# Patient Record
Sex: FEMALE | Race: WHITE | Employment: UNEMPLOYED | URBAN - METROPOLITAN AREA
[De-identification: names, ages, dates, MRNs, and addresses within clinical notes are randomized per-mention and may not be internally consistent; named-entity substitution may affect disease eponyms.]

---

## 2018-09-08 LAB
ANTIBODY SCREEN, EXTERNAL: NORMAL
CHLAMYDIA, EXTERNAL: NORMAL
HBSAG, EXTERNAL: NORMAL
HEPATITIS C AB,   EXT: NORMAL
N. GONORRHEA, EXTERNAL: NORMAL
RUBELLA, EXTERNAL: NORMAL
TYPE, ABO & RH, EXTERNAL: NORMAL

## 2019-11-08 ENCOUNTER — HOSPITAL ENCOUNTER (INPATIENT)
Age: 29
LOS: 2 days | Discharge: HOME | End: 2019-11-10
Attending: OBSTETRICS & GYNECOLOGY | Admitting: OBSTETRICS & GYNECOLOGY
Payer: OTHER GOVERNMENT

## 2019-11-08 LAB
ABO + RH BLD: NORMAL
ALBUMIN SERPL-MCNC: 2.5 G/DL (ref 3.4–5)
ALBUMIN/GLOB SERPL: 0.8 {RATIO} (ref 0.8–1.7)
ALP SERPL-CCNC: 183 U/L (ref 45–117)
ALT SERPL-CCNC: 19 U/L (ref 13–56)
AMPHET UR QL SCN: NEGATIVE
ANION GAP SERPL CALC-SCNC: 9 MMOL/L (ref 3–18)
AST SERPL-CCNC: 16 U/L (ref 10–38)
BARBITURATES UR QL SCN: NEGATIVE
BASOPHILS # BLD: 0 K/UL (ref 0–0.1)
BASOPHILS NFR BLD: 0 % (ref 0–2)
BENZODIAZ UR QL: NEGATIVE
BILIRUB SERPL-MCNC: 0.2 MG/DL (ref 0.2–1)
BLOOD GROUP ANTIBODIES SERPL: NORMAL
BUN SERPL-MCNC: 14 MG/DL (ref 7–18)
BUN/CREAT SERPL: 18 (ref 12–20)
CALCIUM SERPL-MCNC: 9.1 MG/DL (ref 8.5–10.1)
CANNABINOIDS UR QL SCN: NEGATIVE
CHLORIDE SERPL-SCNC: 106 MMOL/L (ref 100–111)
CO2 SERPL-SCNC: 24 MMOL/L (ref 21–32)
COCAINE UR QL SCN: NEGATIVE
CREAT SERPL-MCNC: 0.78 MG/DL (ref 0.6–1.3)
DIFFERENTIAL METHOD BLD: ABNORMAL
EOSINOPHIL # BLD: 0.1 K/UL (ref 0–0.4)
EOSINOPHIL NFR BLD: 1 % (ref 0–5)
ERYTHROCYTE [DISTWIDTH] IN BLOOD BY AUTOMATED COUNT: 15.5 % (ref 11.6–14.5)
GLOBULIN SER CALC-MCNC: 3.2 G/DL (ref 2–4)
GLUCOSE SERPL-MCNC: 112 MG/DL (ref 74–99)
HCT VFR BLD AUTO: 37.4 % (ref 35–45)
HDSCOM,HDSCOM: NORMAL
HGB BLD-MCNC: 12.2 G/DL (ref 12–16)
LYMPHOCYTES # BLD: 1.6 K/UL (ref 0.9–3.6)
LYMPHOCYTES NFR BLD: 18 % (ref 21–52)
MCH RBC QN AUTO: 32.2 PG (ref 24–34)
MCHC RBC AUTO-ENTMCNC: 32.6 G/DL (ref 31–37)
MCV RBC AUTO: 98.7 FL (ref 74–97)
METHADONE UR QL: NEGATIVE
MONOCYTES # BLD: 1 K/UL (ref 0.05–1.2)
MONOCYTES NFR BLD: 11 % (ref 3–10)
NEUTS SEG # BLD: 6.3 K/UL (ref 1.8–8)
NEUTS SEG NFR BLD: 70 % (ref 40–73)
OPIATES UR QL: NEGATIVE
PCP UR QL: NEGATIVE
PLATELET # BLD AUTO: 172 K/UL (ref 135–420)
PMV BLD AUTO: 9.9 FL (ref 9.2–11.8)
POTASSIUM SERPL-SCNC: 4.1 MMOL/L (ref 3.5–5.5)
PROT SERPL-MCNC: 5.7 G/DL (ref 6.4–8.2)
RBC # BLD AUTO: 3.79 M/UL (ref 4.2–5.3)
SODIUM SERPL-SCNC: 139 MMOL/L (ref 136–145)
SPECIMEN EXP DATE BLD: NORMAL
WBC # BLD AUTO: 9 K/UL (ref 4.6–13.2)

## 2019-11-08 PROCEDURE — 80307 DRUG TEST PRSMV CHEM ANLYZR: CPT

## 2019-11-08 PROCEDURE — 65270000029 HC RM PRIVATE

## 2019-11-08 PROCEDURE — 86900 BLOOD TYPING SEROLOGIC ABO: CPT

## 2019-11-08 PROCEDURE — 85025 COMPLETE CBC W/AUTO DIFF WBC: CPT

## 2019-11-08 PROCEDURE — 80053 COMPREHEN METABOLIC PANEL: CPT

## 2019-11-09 ENCOUNTER — ANESTHESIA (OUTPATIENT)
Dept: LABOR AND DELIVERY | Age: 29
End: 2019-11-09
Payer: OTHER GOVERNMENT

## 2019-11-09 ENCOUNTER — ANESTHESIA EVENT (OUTPATIENT)
Dept: LABOR AND DELIVERY | Age: 29
End: 2019-11-09
Payer: OTHER GOVERNMENT

## 2019-11-09 PROBLEM — O26.90 ABNORMAL PREGNANCY: Status: ACTIVE | Noted: 2019-11-09

## 2019-11-09 LAB
HCT VFR BLD AUTO: 34.5 % (ref 35–45)
HGB BLD-MCNC: 11.2 G/DL (ref 12–16)

## 2019-11-09 PROCEDURE — 74011000250 HC RX REV CODE- 250: Performed by: NURSE ANESTHETIST, CERTIFIED REGISTERED

## 2019-11-09 PROCEDURE — 74011000250 HC RX REV CODE- 250

## 2019-11-09 PROCEDURE — 74011250636 HC RX REV CODE- 250/636: Performed by: NURSE ANESTHETIST, CERTIFIED REGISTERED

## 2019-11-09 PROCEDURE — 75410000003 HC RECOV DEL/VAG/CSECN EA 0.5 HR

## 2019-11-09 PROCEDURE — 77030040830 HC CATH URETH FOL MDII -A

## 2019-11-09 PROCEDURE — 76060000078 HC EPIDURAL ANESTHESIA

## 2019-11-09 PROCEDURE — 77030007879 HC KT SPN EPDRL TELE -B: Performed by: NURSE ANESTHETIST, CERTIFIED REGISTERED

## 2019-11-09 PROCEDURE — 85014 HEMATOCRIT: CPT

## 2019-11-09 PROCEDURE — 3E0R3BZ INTRODUCTION OF ANESTHETIC AGENT INTO SPINAL CANAL, PERCUTANEOUS APPROACH: ICD-10-PCS | Performed by: OBSTETRICS & GYNECOLOGY

## 2019-11-09 PROCEDURE — 74011250636 HC RX REV CODE- 250/636: Performed by: OBSTETRICS & GYNECOLOGY

## 2019-11-09 PROCEDURE — 75410000002 HC LABOR FEE PER 1 HR

## 2019-11-09 PROCEDURE — 74011250637 HC RX REV CODE- 250/637: Performed by: OBSTETRICS & GYNECOLOGY

## 2019-11-09 PROCEDURE — 00HU33Z INSERTION OF INFUSION DEVICE INTO SPINAL CANAL, PERCUTANEOUS APPROACH: ICD-10-PCS | Performed by: OBSTETRICS & GYNECOLOGY

## 2019-11-09 PROCEDURE — 85018 HEMOGLOBIN: CPT

## 2019-11-09 PROCEDURE — 75410000000 HC DELIVERY VAGINAL/SINGLE

## 2019-11-09 PROCEDURE — 65270000029 HC RM PRIVATE

## 2019-11-09 PROCEDURE — 36415 COLL VENOUS BLD VENIPUNCTURE: CPT

## 2019-11-09 RX ORDER — SODIUM CHLORIDE, SODIUM LACTATE, POTASSIUM CHLORIDE, CALCIUM CHLORIDE 600; 310; 30; 20 MG/100ML; MG/100ML; MG/100ML; MG/100ML
125 INJECTION, SOLUTION INTRAVENOUS CONTINUOUS
Status: DISCONTINUED | OUTPATIENT
Start: 2019-11-09 | End: 2019-11-10 | Stop reason: HOSPADM

## 2019-11-09 RX ORDER — OXYTOCIN/RINGER'S LACTATE 20/1000 ML
125 PLASTIC BAG, INJECTION (ML) INTRAVENOUS CONTINUOUS
Status: DISCONTINUED | OUTPATIENT
Start: 2019-11-09 | End: 2019-11-09 | Stop reason: HOSPADM

## 2019-11-09 RX ORDER — MINERAL OIL
30 OIL (ML) ORAL AS NEEDED
Status: DISCONTINUED | OUTPATIENT
Start: 2019-11-09 | End: 2019-11-09 | Stop reason: HOSPADM

## 2019-11-09 RX ORDER — HYDROMORPHONE HYDROCHLORIDE 1 MG/ML
1 INJECTION, SOLUTION INTRAMUSCULAR; INTRAVENOUS; SUBCUTANEOUS
Status: DISCONTINUED | OUTPATIENT
Start: 2019-11-09 | End: 2019-11-09 | Stop reason: HOSPADM

## 2019-11-09 RX ORDER — SODIUM CHLORIDE, SODIUM LACTATE, POTASSIUM CHLORIDE, CALCIUM CHLORIDE 600; 310; 30; 20 MG/100ML; MG/100ML; MG/100ML; MG/100ML
125 INJECTION, SOLUTION INTRAVENOUS CONTINUOUS
Status: DISCONTINUED | OUTPATIENT
Start: 2019-11-09 | End: 2019-11-09 | Stop reason: HOSPADM

## 2019-11-09 RX ORDER — FENTANYL CITRATE 50 UG/ML
100 INJECTION, SOLUTION INTRAMUSCULAR; INTRAVENOUS ONCE
Status: COMPLETED | OUTPATIENT
Start: 2019-11-09 | End: 2019-11-09

## 2019-11-09 RX ORDER — NALBUPHINE HYDROCHLORIDE 10 MG/ML
10 INJECTION, SOLUTION INTRAMUSCULAR; INTRAVENOUS; SUBCUTANEOUS
Status: DISCONTINUED | OUTPATIENT
Start: 2019-11-09 | End: 2019-11-09 | Stop reason: HOSPADM

## 2019-11-09 RX ORDER — FENTANYL CITRATE 50 UG/ML
INJECTION, SOLUTION INTRAMUSCULAR; INTRAVENOUS
Status: DISPENSED
Start: 2019-11-09 | End: 2019-11-09

## 2019-11-09 RX ORDER — MISOPROSTOL 200 UG/1
TABLET ORAL
Status: DISPENSED
Start: 2019-11-09 | End: 2019-11-09

## 2019-11-09 RX ORDER — TERBUTALINE SULFATE 1 MG/ML
0.25 INJECTION SUBCUTANEOUS
Status: DISCONTINUED | OUTPATIENT
Start: 2019-11-09 | End: 2019-11-09 | Stop reason: HOSPADM

## 2019-11-09 RX ORDER — ROPIVACAINE HYDROCHLORIDE 2 MG/ML
INJECTION, SOLUTION EPIDURAL; INFILTRATION; PERINEURAL AS NEEDED
Status: DISCONTINUED | OUTPATIENT
Start: 2019-11-09 | End: 2019-11-09 | Stop reason: HOSPADM

## 2019-11-09 RX ORDER — AMOXICILLIN 250 MG
1 CAPSULE ORAL
Status: DISCONTINUED | OUTPATIENT
Start: 2019-11-09 | End: 2019-11-10 | Stop reason: HOSPADM

## 2019-11-09 RX ORDER — PROMETHAZINE HYDROCHLORIDE 25 MG/ML
25 INJECTION, SOLUTION INTRAMUSCULAR; INTRAVENOUS
Status: DISCONTINUED | OUTPATIENT
Start: 2019-11-09 | End: 2019-11-10 | Stop reason: HOSPADM

## 2019-11-09 RX ORDER — EPHEDRINE SULFATE/0.9% NACL/PF 50 MG/5 ML
10 SYRINGE (ML) INTRAVENOUS AS NEEDED
Status: DISCONTINUED | OUTPATIENT
Start: 2019-11-09 | End: 2019-11-09 | Stop reason: HOSPADM

## 2019-11-09 RX ORDER — METHYLERGONOVINE MALEATE 0.2 MG/ML
0.2 INJECTION INTRAVENOUS AS NEEDED
Status: DISCONTINUED | OUTPATIENT
Start: 2019-11-09 | End: 2019-11-09 | Stop reason: HOSPADM

## 2019-11-09 RX ORDER — ACETAMINOPHEN 325 MG/1
650 TABLET ORAL
Status: DISCONTINUED | OUTPATIENT
Start: 2019-11-09 | End: 2019-11-10 | Stop reason: HOSPADM

## 2019-11-09 RX ORDER — IBUPROFEN 400 MG/1
800 TABLET ORAL
Status: DISCONTINUED | OUTPATIENT
Start: 2019-11-09 | End: 2019-11-10 | Stop reason: HOSPADM

## 2019-11-09 RX ORDER — PHENYLEPHRINE HCL IN 0.9% NACL 1 MG/10 ML
80 SYRINGE (ML) INTRAVENOUS AS NEEDED
Status: DISCONTINUED | OUTPATIENT
Start: 2019-11-09 | End: 2019-11-09 | Stop reason: HOSPADM

## 2019-11-09 RX ORDER — LIDOCAINE HYDROCHLORIDE AND EPINEPHRINE 15; 5 MG/ML; UG/ML
INJECTION, SOLUTION EPIDURAL
Status: COMPLETED | OUTPATIENT
Start: 2019-11-09 | End: 2019-11-09

## 2019-11-09 RX ORDER — LIDOCAINE HYDROCHLORIDE 10 MG/ML
20 INJECTION, SOLUTION EPIDURAL; INFILTRATION; INTRACAUDAL; PERINEURAL AS NEEDED
Status: DISCONTINUED | OUTPATIENT
Start: 2019-11-09 | End: 2019-11-09 | Stop reason: HOSPADM

## 2019-11-09 RX ORDER — ZOLPIDEM TARTRATE 5 MG/1
5 TABLET ORAL
Status: DISCONTINUED | OUTPATIENT
Start: 2019-11-09 | End: 2019-11-10 | Stop reason: HOSPADM

## 2019-11-09 RX ORDER — OXYCODONE AND ACETAMINOPHEN 5; 325 MG/1; MG/1
2 TABLET ORAL
Status: DISCONTINUED | OUTPATIENT
Start: 2019-11-09 | End: 2019-11-10 | Stop reason: HOSPADM

## 2019-11-09 RX ORDER — OXYTOCIN/RINGER'S LACTATE 20/1000 ML
999 PLASTIC BAG, INJECTION (ML) INTRAVENOUS ONCE
Status: COMPLETED | OUTPATIENT
Start: 2019-11-09 | End: 2019-11-09

## 2019-11-09 RX ORDER — BUTORPHANOL TARTRATE 1 MG/ML
2 INJECTION INTRAMUSCULAR; INTRAVENOUS
Status: DISCONTINUED | OUTPATIENT
Start: 2019-11-09 | End: 2019-11-09 | Stop reason: HOSPADM

## 2019-11-09 RX ADMIN — ACETAMINOPHEN 650 MG: 325 TABLET, FILM COATED ORAL at 13:10

## 2019-11-09 RX ADMIN — FENTANYL CITRATE 100 MCG: 50 INJECTION, SOLUTION INTRAMUSCULAR; INTRAVENOUS at 01:31

## 2019-11-09 RX ADMIN — LIDOCAINE HYDROCHLORIDE,EPINEPHRINE BITARTRATE 3 ML: 15; .005 INJECTION, SOLUTION EPIDURAL; INFILTRATION; INTRACAUDAL; PERINEURAL at 01:42

## 2019-11-09 RX ADMIN — SODIUM CHLORIDE, SODIUM LACTATE, POTASSIUM CHLORIDE, AND CALCIUM CHLORIDE 1000 ML: 600; 310; 30; 20 INJECTION, SOLUTION INTRAVENOUS at 01:57

## 2019-11-09 RX ADMIN — Medication 10 MG: at 02:05

## 2019-11-09 RX ADMIN — SODIUM CHLORIDE, SODIUM LACTATE, POTASSIUM CHLORIDE, AND CALCIUM CHLORIDE 125 ML/HR: 600; 310; 30; 20 INJECTION, SOLUTION INTRAVENOUS at 01:31

## 2019-11-09 RX ADMIN — Medication 10 ML/HR: at 01:49

## 2019-11-09 RX ADMIN — IBUPROFEN 800 MG: 400 TABLET ORAL at 06:15

## 2019-11-09 RX ADMIN — ROPIVACAINE HYDROCHLORIDE 8 ML: 2 INJECTION, SOLUTION EPIDURAL; INFILTRATION at 01:48

## 2019-11-09 RX ADMIN — Medication 10 ML: at 01:45

## 2019-11-09 RX ADMIN — Medication 19980 MILLI-UNITS/HR: at 06:16

## 2019-11-09 RX ADMIN — IBUPROFEN 800 MG: 400 TABLET ORAL at 16:24

## 2019-11-09 NOTE — ROUTINE PROCESS
TRANSFER - IN REPORT: 
 
0725--Verbal report received from [facility], RN (name) on Shelbi Ledezma  being received from Labor and Delivery (unit) for routine progression of care Report consisted of patients Situation, Background, Assessment and  
Recommendations(SBAR). Information from the following report(s) SBAR, Kardex, Intake/Output, MAR and Recent Results was reviewed with the receiving nurse. Opportunity for questions and clarification was provided. 0850--Assessment completed. Vitals stable. Educated patient on normal bleeding and when to call nurse for assistance. Fundus firm, lochia small. 1300--Assessment completed. Vitals stable. 1625--Assessment completed. Vitals stable. BP elevated, will recheck in 1 hour. 1730--BP elevated. Patient denies vision changes and epigastric pain. Patient states she does have a headache that she's had on and off today. Patient states \"headaches are normal for me\" 1737--Dr. Andrei Fitzgerald paged. 1740--Spoke with Dr. Andrei Fitzgerald. Orders received to recheck BP at 2000 and call her if it's elevated. 1813--Mother doing well. Up without complaints. Voiding without problems. Pain managed well with motrin and tylenol. Bonding well with baby.

## 2019-11-09 NOTE — ANESTHESIA PREPROCEDURE EVALUATION
Relevant Problems   No relevant active problems       Anesthetic History   No history of anesthetic complications            Review of Systems / Medical History      Pulmonary  Within defined limits                 Neuro/Psych   Within defined limits           Cardiovascular    Hypertension: well controlled                   GI/Hepatic/Renal  Within defined limits              Endo/Other        Obesity     Other Findings              Physical Exam    Airway  Mallampati: II  TM Distance: 4 - 6 cm  Neck ROM: normal range of motion   Mouth opening: Normal     Cardiovascular    Rhythm: regular  Rate: normal         Dental  No notable dental hx       Pulmonary  Breath sounds clear to auscultation               Abdominal  GI exam deferred       Other Findings            Anesthetic Plan    ASA: 2  Anesthesia type: epidural            Anesthetic plan and risks discussed with: Patient and Spouse

## 2019-11-09 NOTE — ANESTHESIA POSTPROCEDURE EVALUATION
Post-Anesthesia Evaluation & Assessment    Visit Vitals  /79   Pulse (!) 101   Temp 37.1 °C (98.7 °F)   Resp 18   Ht 5' 8\" (1.727 m)   Wt 113.4 kg (250 lb)   SpO2 99%   Breastfeeding? Yes   BMI 38.01 kg/m²       Nausea/Vomiting: no nausea    Pain score (VAS): 0    Post-operative hydration adequate. Mental status & Level of consciousness: orientation per pre-anesthetic level    Neurological status: moves all extremities, sensation grossly intact    Pulmonary status: airway patent, no supplemental oxygen required    Complications related to anesthesia: none    Additional comments: Patient standing in room, caring for infant, denies headaches or complications associated with epidural.         Carole Azar CRNA  November 9, 2019  * No procedures listed *.    epidural    <BSHSIANPOST>    No vitals data found for the desired time range.

## 2019-11-09 NOTE — H&P
History & Physical    Name: Najma Duffy MRN: [id number]  SSN: [id number]    YOB: 1990  Age: 33 y.o. Sex: female        Subjective:     Estimated Date of Delivery: 19  OB History        5    Para   4    Term                AB        Living   4       SAB        TAB        Ectopic        Molar        Multiple        Live Births                    Ms. Liliam Gomez is admitted with pregnancy at 39w5d for SROM @ 2045 of bloody fluid. . Prenatal course was complicated by a single prenatal visit @ 21 weeks. .Prenatal care has been followed by [facility]. She has recently moved here from [place] and spouse is in the [employer]. She presented by ambulance for SROM. She was 1 cm on presentation and had progressed to 5 cm/90/0. Please see prenatal records for details. Past Medical History:   Diagnosis Date    Anemia     Essential hypertension      Past Surgical History:   Procedure Laterality Date    HX HERNIA REPAIR      HX OTHER SURGICAL       Social History     Occupational History    Not on file   Tobacco Use    Smoking status: Never Smoker    Smokeless tobacco: Never Used   Substance and Sexual Activity    Alcohol use: Not Currently    Drug use: Never    Sexual activity: Yes     Family History   Problem Relation Age of Onset    No Known Problems Mother     Hypertension Father     Stroke Father     Heart Disease Father     Asthma Father     Diabetes Father        No Known Allergies  Prior to Admission medications    Medication Sig Start Date End Date Taking? Authorizing Provider   PNV66-Iron Fumarate-FA-DSS-DHA 26-1.2- mg cap Take  by mouth. Yes Provider, Historical        Review of Systems: A comprehensive review of systems was negative except for that written in the HPI.     Objective:     Vitals:  Vitals:    19 2300 19 2315 19 2343 19 2345   BP: 124/59 125/74 (!) 143/123 (!) 144/113   Pulse: 88 92 (!) 104 (!) 118   Temp:       Weight: Height:            Physical Exam:  Heart: Regular rate and rhythm  Lung: clear to auscultation throughout lung fields, no wheezes, no rales, no rhonchi and normal respiratory effort  Fundus: soft and non tender  Perineum: blood present, amniotic fluid present  Cervical Exam: 5 cm dilated    90% effaced    0 station    Lower Extremities:  - Edema 1+  Membranes:  Spontaneous Rupture of Membranes; Amniotic Fluid: clear fluid  Fetal Heart Rate: minimal variability, difficult to trace    Prenatal Labs:   Lab Results   Component Value Date/Time    Rubella, External pos 2018    HBsAg, External neg 2018    Gonorrhea, External neg 2018    Chlamydia, External neg 2018     GBS- not done. Records confirm GBS + 2018    Assessment/Plan:     Plan: Admit for Labor  Progressing normally. Group B Strep was not tested. Insufficient prenatal care- UDS negative. Rubella- equivocal or low normal- will address postpartum  Desires epidural.  Anticipate . Case management consult placed.   Signed By:  Stephan Holbrook MD     2019

## 2019-11-09 NOTE — ANESTHESIA PROCEDURE NOTES
Epidural Block    Start time: 11/9/2019 1:25 AM  End time: 11/9/2019 1:50 AM  Performed by: Hermelinda Frank CRNA  Authorized by: Hermelinda Frank CRNA     Pre-Procedure  Indication: labor epidural    Preanesthetic Checklist: patient identified, risks and benefits discussed, anesthesia consent, site marked, patient being monitored, timeout performed and anesthesia consent    Timeout Time: 01:27        Epidural:   Patient position:  Seated  Prep region:  Lumbar  Prep: Betadine and Patient draped    Location:  L3-4    Needle and Epidural Catheter:   Needle Type:  Tuohy  Needle Gauge:  18 G  Injection Technique:  Loss of resistance using air  Attempts:  1  Catheter Size:  20 G  Events: no blood with aspiration, no cerebrospinal fluid with aspiration, no paresthesia and negative aspiration test    Test Dose:  Negative    Assessment:   Catheter Secured:  Tegaderm and tape  Insertion:  Uncomplicated  Patient tolerance:  Patient tolerated the procedure well with no immediate complications  Risks and benefits discussed with patient and spouse,patient wishes to proceed.

## 2019-11-09 NOTE — PROGRESS NOTES
Pt present to labor and delivery as a  at 39w5d with 1 prenatal visit. Pt reports she ruptured at  with bloody fluid, SVE 1 cm. Pt reports she is due 19. Pt has a very flat affect. Explained to patient I would be getting urine drug screen. Pt verbalized understanding. Dr. Hue Laguerre paged. Consents signed. IV started. Labs drawn and sent  0100 Dr. Hue Laguerre At bedside. 0115 Pt requesting Epidural. Anesthesia Paged. Pt reports pain level is 10/10  0125 Anesthesia at bedside. 0127 Time out at bedside  149 Epidural procedure complete. Pt reports pain level is 0/10  0236 Pt is comfortable and resting.  0408 Update Dr. Hue Laguerre on patient . We will begin pushing  0420 Called for Dr. Hue Laguerre to come for delivery  0425 Dr. Hue Laguerre at bedside  0430 Delivery of viable female infant  5056 Delivery of placenta  [id number] L&D recovery started  0450 Epidural catheter pulled blue tip intact. 0500 Pt tolerating regular diet  0540 Pt ambulated to bathroom. Voided 400 cc demonstrated pericare with bottle and dermaplast return demonstration appropriate. 0615 Pt ambulated to MBU to room 3412  0700 SBAR report given to oncoming shift.

## 2019-11-10 VITALS
SYSTOLIC BLOOD PRESSURE: 134 MMHG | TEMPERATURE: 98.8 F | OXYGEN SATURATION: 97 % | BODY MASS INDEX: 37.89 KG/M2 | DIASTOLIC BLOOD PRESSURE: 87 MMHG | RESPIRATION RATE: 18 BRPM | HEART RATE: 97 BPM | WEIGHT: 250 LBS | HEIGHT: 68 IN

## 2019-11-10 LAB — HCT VFR BLD AUTO: 37.1 % (ref 35–45)

## 2019-11-10 PROCEDURE — 74011250637 HC RX REV CODE- 250/637: Performed by: OBSTETRICS & GYNECOLOGY

## 2019-11-10 PROCEDURE — 36415 COLL VENOUS BLD VENIPUNCTURE: CPT

## 2019-11-10 PROCEDURE — 85014 HEMATOCRIT: CPT

## 2019-11-10 RX ORDER — IBUPROFEN 800 MG/1
800 TABLET ORAL
Qty: 30 TAB | Refills: 0 | Status: SHIPPED | OUTPATIENT
Start: 2019-11-10

## 2019-11-10 RX ADMIN — IBUPROFEN 800 MG: 400 TABLET ORAL at 13:09

## 2019-11-10 RX ADMIN — IBUPROFEN 800 MG: 400 TABLET ORAL at 03:55

## 2019-11-10 NOTE — DISCHARGE SUMMARY
Obstetrical Discharge Summary       [facility]  [address] [address]              Patient ID:  Padmini Frost  [id number]  64 y.o.  1990    Admit Date: 2019    Discharge Date: 11/10/2019     Admitting Physician: Andrade Mccarthy MD     Admission Diagnoses: Abnormal pregnancy [O26.90]    Discharge Diagnoses: same as above      Additional Diagnoses: none        Hospital Course: Unremarkable. She presented to L&D and delivered a VFI over intact perineum. She had minimal prenatal care with a single visit. She has had a normal postpartum period and is bottle feeding. She denies emotional concerns. Information for the patient's :  Ryan Marin [[id number]]          Immunizations: There is no immunization history for the selected administration types on file for this patient. Group Beta Strep: No results found for: GRBSEXT     Visit Vitals  /82 (BP 1 Location: Right arm, BP Patient Position: At rest;Sitting)   Pulse 100   Temp 98 °F (36.7 °C)   Resp 18   Ht 5' 8\" (1.727 m)   Wt 250 lb (113.4 kg)   LMP 2019   SpO2 98%   Breastfeeding? Yes   BMI 38.01 kg/m²       Vital signs stable, afebrile. Exam:  Patient without distress. Abdomen soft, fundus firm at level of umbilicus, non tender               Perineum with normal lochia noted. Lower extremities are negative for swelling, cords or tenderness. Patient Instructions:   Current Discharge Medication List      START taking these medications    Details   ibuprofen (MOTRIN) 800 mg tablet Take 1 Tab by mouth every eight (8) hours as needed for Pain. Indications: pain  Qty: 30 Tab, Refills: 0         CONTINUE these medications which have NOT CHANGED    Details   PNV66-Iron Fumarate-FA-DSS-DHA 26-1.2- mg cap Take  by mouth. See discharge instructions provided by nursing.     Follow-up in 6 weeks.    Signed:  Cristina House MD  11/10/2019  5:26 PM

## 2019-11-10 NOTE — PROGRESS NOTES
Progress Note    Patient: Gary Richardson MRN: [id number]  SSN: [id number]    YOB: 1990  Age: 33 y.o. Sex: female      Subjective:     Postpartum Day: 1     Delivery: vaginal delivery    Pt denies complaints. She has normal pain and bleeding. She is bottle feeding. Denies any emotional concerns. Objective:      Patient Vitals for the past 8 hrs:   BP Temp Pulse Resp SpO2   11/10/19 0830 129/82 98 °F (36.7 °C) 100 18 98 %     Insert Hgb Here    CV:        CHEST:        Uterine Fundus:   firm       Incision:  no significant drainage, no dehiscence, no significant erythema         Lab/Data Review:  CBC:   Lab Results   Component Value Date/Time    HCT 37.1 11/10/2019 05:00 AM       Assessment:     Status post: Doing well postpartum vaginal delivery     Plan:   Insufficient prenatal care- Case management consult pending. Postpartum care discussed including diet, ambulation, and actvitiy restrictions. Discharge instructions and questions answered for vaginal delivery.     Signed By: Marcio Chow MD     November 10, 2019

## 2019-11-10 NOTE — PROGRESS NOTES
2020- Spoke to Dr. Titi Waldron about pt's blood pressure of 142/88. No orders received at this time.  She said no interventions needed unless blood pressure goes above 160/110

## 2019-11-10 NOTE — PROGRESS NOTES
0725- Bedside and Verbal shift change report given to Kaylie candelario (oncoming nurse) by TAINA Elias rn (offgoing nurse). Report included the following information SBAR, Kardex, Procedure Summary, Intake/Output, MAR and Recent Results. Patient aware of hourly rounding and not waking patient if sleeping. 0830- Assessment completed at this time. Call light in reach. Bed low position, bed brakes on, bed rails x2. No needs. Updated on plan of care. [facility]  at bedside. 1630- Reassessment completed at this time. Call light in reach. Bed low position, bed brakes on, bed rails x2. No needs. Updated on plan of care. 1730- Aware of upcoming discharge. 0- Discharge instructions reviewed with patient. Understanding verbalized of all instructions given. Time given for questions, all questions answered. Electronic signature obtained.

## 2019-11-10 NOTE — ROUTINE PROCESS
Verbal shift change report given to Reji Ríos RN by Ileana Valentine RN. Report included the following information SBAR, Kardex, Procedure Summary, Intake/Output, MAR, Accordion, Recent Results and Med Rec Status.

## 2019-11-10 NOTE — PROGRESS NOTES
Paged by [facility], informed by nurse that case management consult in for lack of pre-ghulam care. Meet with pt & spouse, verified all demographics. Asked her about support @ home,  lives with her  & has \"other family\" here as well. [facility] she was seen @ [facility] for pre- care in , July & August & then she went to [place] for awhile to care for her father, who is dying. [facility] took pre-ghulam vitamins. [facility] has all the baby equipment. [facility] will be going to pediatrician @ [facility].  [facility] feels ready to go home. Spoke with pt's nurse & informed her of above. Lakeshia CoronaRN,ext [phone].

## 2019-11-10 NOTE — L&D DELIVERY NOTE
Delivery Summary    Patient: Ras Higgins MRN: [id number]  SSN: [id number]    YOB: 1990  Age: 33 y.o. Sex: female       Information for the patient's :  Amie Shoulder [[id number]]       Labor Events:    Labor: No    Steroids: None   Cervical Ripening Date/Time:       Cervical Ripening Type: None   Antibiotics During Labor: No   Rupture Identifier:      Rupture Date/Time: 2019 8:45 PM   Rupture Type: SROM   Amniotic Fluid Volume: Moderate    Amniotic Fluid Description: Blood stained    Amniotic Fluid Odor: None    Induction: None       Induction Date/Time:        Indications for Induction:      Augmentation: None   Augmentation Date/Time:      Indications for Augmentation:     Labor complications: None       Additional complications:        Delivery Events:  Indications For Episiotomy:     Episiotomy:     Perineal Laceration(s):     Repaired:     Periurethral Laceration Location:      Repaired:     Labial Laceration Location:     Repaired:     Sulcal Laceration Location:     Repaired:     Vaginal Laceration Location:     Repaired:     Cervical Laceration Location:     Repaired:     Repair Suture:     Number of Repair Packets:     Estimated Blood Loss (ml):  400 ml     Delivery Date: 2019    Delivery Time: 4:30 AM  Delivery Type:    Sex:  Female    Gestational Age: 39w5d   Delivery Clinician:  Liberty Brittle  Living Status: Living   Delivery Location: L&D            APGARS  One minute Five minutes Ten minutes   Skin color: 1   1        Heart rate: 2   2        Grimace: 2   2        Muscle tone: 2   2        Breathin   2        Totals: 9   9            Presentation:      Position:        Resuscitation Method:  Suctioning-bulb; Tactile Stimulation     Meconium Stained:        Cord Information:    Complications:    Cord around:    Delayed cord clamping?     Cord clamped date/time:   Disposition of Cord Blood:      Blood Gases Sent?:      Placenta:  Date/Time: Removal:        Appearance:        Measurements:  Birth Weight: 7 lb 9.3 oz (3.44 kg)      Birth Length: 1' 6.5\" (0.47 m)      Head Circumference: 1' 1.98\" (0.355 m)      Chest Circumference: 1' 1.78\" (0.35 m)     Abdominal Girth: 1' 2.37\" (0.365 m)    Other Providers:   HARRY, Jake Nelson, CHEYENNE Gary, Obstetrician;Primary Nurse;Primary  Nurse;Nicu Nurse;Neonatologist;Anesthesiologist;Crna;Nurse Practitioner;Midwife;Nursery Nurse           Group B Strep: No results found for: Italia Romeo  Information for the patient's :  Jan Ash [[id number]]     Lab Results   Component Value Date/Time    ABO/Rh(D) O POSITIVE 2019 04:45 AM    ABY IgG NEG 2019 04:45 AM     No results for input(s): PCO2CB, PO2CB, HCO3I, SO2I, IBD, PTEMPI, SPECTI, PHICB, ISITE, IDEV, IALLEN in the last 72 hours.

## 2019-11-11 NOTE — PROGRESS NOTES
Mom refused MMR (rubella immune) this visit. Discharge instructions given for mom and baby. Mom electronically signed for herself and baby. Deny questions. Mom aware of follow up visit with [facility] in 6 weeks and baby's appointment on Monday. Mom signed ID sheet matching bracelet numbers. Wheeled mom down with baby in car seat on her lap. Called family ten minutes after discharge to notify them that they left a winter jacket in the bathroom. They state they will be back this evening to get it.